# Patient Record
Sex: MALE | Race: WHITE
[De-identification: names, ages, dates, MRNs, and addresses within clinical notes are randomized per-mention and may not be internally consistent; named-entity substitution may affect disease eponyms.]

---

## 2021-01-07 NOTE — NUR
41 Y/O MALE BIBA FROM [employer] [facility] C/O SOB/ NASUEA, PATIENT STATES "I 
JUST DONT FEEL GOOD". NO RESP DISTRESS AT THIS TIME. VSS. AAOX4. 

NO PMH

ALLERGIES : HALDOL

## 2021-01-07 NOTE — NUR
PT SITTING IN [facility] HALLW[facility], IN NO ACUTE DISTRESS NOTED. VSS, AT ARMS LENGTH 
AND WITHIN SIGHT. 1:1 MONITORING FOR DTS. WILL CONT TO MONITOR. ALL SUICIDE 
PRECAUTIONS IN PLACE PT DENIES HAVING ANY THOUGHTS OF HARMING SELF OR OTHERS.

## 2021-01-07 NOTE — NUR
PT SITTING IN [facility] IN NO ACUTE DISTRESS NOTED, RESPIRATIONS ARE EVEN AND 
UNLABORED AS EVIDENCE BY RISE AND FALL OF CHEST WALL. VSS, ON 1:1 MONITORING 
FOR DTS SI/HI. STAFF RN AT ARMS LENGTH FOR THE SAFETY OF SELF AND OTHERS. 
DENIES HAVING ANY THOUGHTS OF HARMING SELF OR OTHERS. DENIES HAVING ANY AH/VH. 
WILL CONT TO MONITOR. ALL SUICIDE PRECAUTIONS IN PLACE.

## 2021-01-08 NOTE — NUR
PT REMAINS ON 4 POINT RESTRAINTS, REMAINS HOSTILE, THREATENING STAFF "FUCK YOU 
GUYS". PT CONTINUES TO BE A DTO. CIRCULATION CHECK BILAT PULSES +2 STRONG. NO 
VISIBLE SIGNS OF INJURY NOTED ON WRIST OR ANKLES. PT WAS OFFERED FLUIDS REFUSED 
AT THIS TIME.

## 2021-01-08 NOTE — NUR
Packet Refaxed to [facility], [facility], [facility] 
for potential openings today.



Intake to be completed and referrred to [facility] for potential direct Admit 
to their unit ([facility])
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
## 2021-01-08 NOTE — NUR
Report given to KURT Obregon at [facility]. ETA for pickup 1 hr.

## 2021-01-08 NOTE — NUR
PT PACING IN ED HALLWAY, IN NO ACUTE DISTRESS NOTED. VSS, AT ARMS LENGTH AND 
WITHIN SIGHT. 1:1 MONITORING FOR DTS. WILL CONT TO MONITOR. ALL SUICIDE 
PRECAUTIONS IN PLACE PT DENIES HAVING ANY THOUGHTS OF HARMING SELF OR OTHERS. 
PT WAS OBSERVED TALKING TO SELF BUT DENIED HAVING ANY AUDITORY HALLUCINATIONS 
OR VISUAL HALLUCINATIONS.

## 2021-01-08 NOTE — NUR
Faxed packet to the following facilities



[facility]

[facility]

## 2021-01-08 NOTE — NUR
Patient to be transferred to [facility].  Is being transferred 
due to psych.  Receiving facility has accepting physician and available space. 
ER physician has signed transfer form.  Patient or responsible party has agreed 
to transfer and signed form.  Patient belongings inventoried and will be sent 
with patient.  Copy of nursing notes, lab reports, EKG, Physicians Orders and 
X-rays to be sent with patient.  Report called to KURT Obregon at receiving 
facility.  [facility] has been called for transfer.

## 2021-01-08 NOTE — NUR
Patient accepted to [facility] under Dr Ramsey. Patient will go 
to bed 122B. Report will be called to KURT Obregon 

[phone]

## 2022-03-11 NOTE — NUR
REPORT GIVEN TO TRACY HICKS FOR CONTINUITY OF CARE. [Negative] : Heme/Lymph [FreeTextEntry3] : He is c/o: left eyelid swelling (started on Monday 3/7/22). Pus came out this morning. No visual losses. The area (eyelid) is: "painful".

## 2023-01-04 ENCOUNTER — HOSPITAL ENCOUNTER (EMERGENCY)
Dept: HOSPITAL 26 - MED | Age: 43
Discharge: HOME | End: 2023-01-04
Payer: COMMERCIAL

## 2023-01-04 VITALS — BODY MASS INDEX: 22.19 KG/M2 | WEIGHT: 155 LBS | HEIGHT: 70 IN

## 2023-01-04 VITALS — DIASTOLIC BLOOD PRESSURE: 68 MMHG | SYSTOLIC BLOOD PRESSURE: 130 MMHG

## 2023-01-04 DIAGNOSIS — S40.811A: ICD-10-CM

## 2023-01-04 DIAGNOSIS — S86.812A: Primary | ICD-10-CM

## 2023-01-04 DIAGNOSIS — Z88.5: ICD-10-CM
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
## 2023-01-04 DIAGNOSIS — S70.211A: ICD-10-CM
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
## 2023-01-04 DIAGNOSIS — S00.81XA: ICD-10-CM
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
## 2023-01-04 DIAGNOSIS — Z79.899: ICD-10-CM

## 2023-01-04 DIAGNOSIS — S00.31XA: ICD-10-CM
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
## 2023-01-04 NOTE — NUR
PATIENT [facility] POLICE DEPT. PATIENT EXAMINED BY DR. ULLOA. PATIENT 
MEDICALLY CLEARED AND RELEASED IN CUSTODY IN STABLE CONDITION. ORIGINAL 
PRE-BOOK FORM GIVEN TO OFFICER YONI, #[id number].

## 2023-01-04 NOTE — NUR
XIOMARA Castillo evaluating patient in chair E. 

-------------------------------------------------------------------------------

Addendum: 01/04/23 at 1913 by TORSTEN

-------------------------------------------------------------------------------

XIOMARA Castillo evaluating patient in chair B.
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
